# Patient Record
Sex: FEMALE | Race: WHITE | HISPANIC OR LATINO | Employment: FULL TIME | URBAN - METROPOLITAN AREA
[De-identification: names, ages, dates, MRNs, and addresses within clinical notes are randomized per-mention and may not be internally consistent; named-entity substitution may affect disease eponyms.]

---

## 2018-01-11 ENCOUNTER — OFFICE VISIT (OUTPATIENT)
Dept: URGENT CARE | Facility: CLINIC | Age: 23
End: 2018-01-11
Payer: COMMERCIAL

## 2018-01-11 ENCOUNTER — HOSPITAL ENCOUNTER (OUTPATIENT)
Facility: MEDICAL CENTER | Age: 23
End: 2018-01-11
Attending: PHYSICIAN ASSISTANT
Payer: COMMERCIAL

## 2018-01-11 VITALS
DIASTOLIC BLOOD PRESSURE: 70 MMHG | TEMPERATURE: 97 F | OXYGEN SATURATION: 97 % | HEIGHT: 62 IN | SYSTOLIC BLOOD PRESSURE: 122 MMHG | BODY MASS INDEX: 29.44 KG/M2 | HEART RATE: 98 BPM | WEIGHT: 160 LBS | RESPIRATION RATE: 16 BRPM

## 2018-01-11 DIAGNOSIS — N30.00 ACUTE CYSTITIS WITHOUT HEMATURIA: ICD-10-CM

## 2018-01-11 LAB
APPEARANCE UR: NORMAL
BILIRUB UR STRIP-MCNC: NORMAL MG/DL
COLOR UR AUTO: YELLOW
GLUCOSE UR STRIP.AUTO-MCNC: NORMAL MG/DL
INT CON NEG: NEGATIVE
INT CON POS: POSITIVE
KETONES UR STRIP.AUTO-MCNC: NORMAL MG/DL
LEUKOCYTE ESTERASE UR QL STRIP.AUTO: NORMAL
NITRITE UR QL STRIP.AUTO: NORMAL
PH UR STRIP.AUTO: 6 [PH] (ref 5–8)
POC URINE PREGNANCY TEST: NORMAL
PROT UR QL STRIP: NORMAL MG/DL
RBC UR QL AUTO: NORMAL
SP GR UR STRIP.AUTO: 1.01
UROBILINOGEN UR STRIP-MCNC: NORMAL MG/DL

## 2018-01-11 PROCEDURE — 81025 URINE PREGNANCY TEST: CPT | Performed by: PHYSICIAN ASSISTANT

## 2018-01-11 PROCEDURE — 87086 URINE CULTURE/COLONY COUNT: CPT

## 2018-01-11 PROCEDURE — 87077 CULTURE AEROBIC IDENTIFY: CPT

## 2018-01-11 PROCEDURE — 81002 URINALYSIS NONAUTO W/O SCOPE: CPT | Performed by: PHYSICIAN ASSISTANT

## 2018-01-11 PROCEDURE — 99203 OFFICE O/P NEW LOW 30 MIN: CPT | Performed by: PHYSICIAN ASSISTANT

## 2018-01-11 PROCEDURE — 87186 SC STD MICRODIL/AGAR DIL: CPT

## 2018-01-11 RX ORDER — NITROFURANTOIN 25; 75 MG/1; MG/1
100 CAPSULE ORAL EVERY 12 HOURS
Qty: 10 CAP | Refills: 0 | Status: SHIPPED | OUTPATIENT
Start: 2018-01-11 | End: 2018-01-16

## 2018-01-11 NOTE — LETTER
January 11, 2018         Patient: Gabrielle Rodas   YOB: 1995   Date of Visit: 1/11/2018           To Whom it May Concern:    Gabrielle Rodas was seen in my clinic on 1/11/2018. Please excuse her from work 1/10-1/11/2018.    If you have any questions or concerns, please don't hesitate to call.        Sincerely,           Khanh Stanley P.A.-C.  Electronically Signed

## 2018-01-12 DIAGNOSIS — N30.00 ACUTE CYSTITIS WITHOUT HEMATURIA: ICD-10-CM

## 2018-01-12 ASSESSMENT — ENCOUNTER SYMPTOMS
FEVER: 0
COUGH: 0
PALPITATIONS: 0
SHORTNESS OF BREATH: 0
FLANK PAIN: 0
BACK PAIN: 0
CHILLS: 0

## 2018-01-12 NOTE — PROGRESS NOTES
"Subjective:      Gabrielle Rodas is a 22 y.o. female who presents with UTI (x 4 days)            UTI   This is a new problem. The current episode started yesterday. The problem occurs constantly. The problem has been waxing and waning. Associated symptoms include urinary symptoms. Pertinent negatives include no chest pain, chills, coughing or fever. Nothing aggravates the symptoms. She has tried nothing for the symptoms.       Review of Systems   Constitutional: Negative for chills and fever.   Respiratory: Negative for cough and shortness of breath.    Cardiovascular: Negative for chest pain and palpitations.   Genitourinary: Positive for dysuria, frequency and urgency. Negative for flank pain and hematuria.   Musculoskeletal: Negative for back pain.   All other systems reviewed and are negative.    PMH:  has a past medical history of History of recurrent UTIs.  MEDS:   Current Outpatient Prescriptions:   •  nitrofurantoin monohydr macro (MACROBID) 100 MG Cap, Take 1 Cap by mouth every 12 hours for 5 days., Disp: 10 Cap, Rfl: 0  •  CRANBERRY PO, Take  by mouth., Disp: , Rfl:   •  hydrocodone-acetaminophen (NORCO) 5-325 MG Tab per tablet, Take 1-2 Tabs by mouth every 6 hours as needed., Disp: 20 Tab, Rfl: 0  ALLERGIES:   Allergies   Allergen Reactions   • Amoxicillin      SURGHX: History reviewed. No pertinent surgical history.  SOCHX:  reports that she has never smoked. She has never used smokeless tobacco. She reports that she uses drugs, including Marijuana. She reports that she does not drink alcohol.  FH: Family history was reviewed, no pertinent findings to report  Medications, Allergies, and current problem list reviewed today in Epic       Objective:     /70   Pulse 98   Temp 36.1 °C (97 °F)   Resp 16   Ht 1.575 m (5' 2\")   Wt 72.6 kg (160 lb)   LMP 11/28/2017   SpO2 97%   Breastfeeding? No   BMI 29.26 kg/m²      Physical Exam   Constitutional: She is oriented to person, place, and time. She " appears well-developed and well-nourished.   Neck: Normal range of motion. Neck supple.   Cardiovascular: Normal rate, regular rhythm and normal heart sounds.    Pulmonary/Chest: Effort normal and breath sounds normal.   Musculoskeletal: She exhibits no tenderness.   No CVA tenderness   Neurological: She is alert and oriented to person, place, and time.   Skin: Skin is warm and dry.   Psychiatric: She has a normal mood and affect. Her behavior is normal. Judgment and thought content normal.   Vitals reviewed.           PREG: NEG     POC Color YELLOW Negative Final   POC Appearance CLOUDY Negative Final   POC Leukocyte Esterase NEG Negative Final   POC Nitrites NEG Negative Final   POC Urobiligen NEG Negative (0.2) mg/dL Final   POC Protein NEG Negative mg/dL Final   POC Urine PH 6.0 5.0 - 8.0 Final   POC Blood TR Negative Final   POC Specific Gravity 1.015 <1.005 - >1.030 Final   POC Ketones NEG Negative mg/dL Final   POC Biliruben NEG Negative mg/dL Final   POC Glucose NEG Negative mg/dL Final       Assessment/Plan:   UA appears unremarkable.  Pt wishes to trial antibiotic while culture is pending due to her symptoms.    1. Acute cystitis without hematuria    - POCT Urinalysis  - POCT PREGNANCY  - Urine Culture; Future  - nitrofurantoin monohydr macro (MACROBID) 100 MG Cap; Take 1 Cap by mouth every 12 hours for 5 days.  Dispense: 10 Cap; Refill: 0    Differential diagnosis, natural history, supportive care, and indications for immediate follow-up discussed at length.   Follow-up with primary care provider within 4-5 days, emergency room precautions discussed.  Patient and/or family appears understanding of information.

## 2018-01-14 LAB
BACTERIA UR CULT: ABNORMAL
BACTERIA UR CULT: ABNORMAL
SIGNIFICANT IND 70042: ABNORMAL
SITE SITE: ABNORMAL
SOURCE SOURCE: ABNORMAL

## 2018-04-17 ENCOUNTER — HOSPITAL ENCOUNTER (EMERGENCY)
Facility: MEDICAL CENTER | Age: 23
End: 2018-04-18
Attending: EMERGENCY MEDICINE

## 2018-04-17 DIAGNOSIS — S60.211A CONTUSION OF RIGHT WRIST, INITIAL ENCOUNTER: ICD-10-CM

## 2018-04-17 DIAGNOSIS — S60.221A CONTUSION OF RIGHT HAND, INITIAL ENCOUNTER: ICD-10-CM

## 2018-04-17 PROCEDURE — 99284 EMERGENCY DEPT VISIT MOD MDM: CPT

## 2018-04-17 ASSESSMENT — PAIN SCALES - GENERAL: PAINLEVEL_OUTOF10: 10

## 2018-04-18 ENCOUNTER — APPOINTMENT (OUTPATIENT)
Dept: RADIOLOGY | Facility: MEDICAL CENTER | Age: 23
End: 2018-04-18
Attending: EMERGENCY MEDICINE

## 2018-04-18 VITALS
SYSTOLIC BLOOD PRESSURE: 146 MMHG | OXYGEN SATURATION: 97 % | DIASTOLIC BLOOD PRESSURE: 89 MMHG | HEIGHT: 62 IN | TEMPERATURE: 96.8 F | RESPIRATION RATE: 18 BRPM | HEART RATE: 92 BPM | BODY MASS INDEX: 28.93 KG/M2 | WEIGHT: 157.19 LBS

## 2018-04-18 PROCEDURE — 73110 X-RAY EXAM OF WRIST: CPT | Mod: RT

## 2018-04-18 PROCEDURE — 700102 HCHG RX REV CODE 250 W/ 637 OVERRIDE(OP): Performed by: EMERGENCY MEDICINE

## 2018-04-18 PROCEDURE — A9270 NON-COVERED ITEM OR SERVICE: HCPCS | Performed by: EMERGENCY MEDICINE

## 2018-04-18 PROCEDURE — 73130 X-RAY EXAM OF HAND: CPT | Mod: RT

## 2018-04-18 RX ORDER — NAPROXEN 500 MG/1
500 TABLET ORAL ONCE
Status: COMPLETED | OUTPATIENT
Start: 2018-04-18 | End: 2018-04-18

## 2018-04-18 RX ADMIN — Medication 500 MG: at 00:30

## 2018-04-18 ASSESSMENT — PAIN SCALES - GENERAL: PAINLEVEL_OUTOF10: 8

## 2018-04-18 NOTE — ED PROVIDER NOTES
"ED Provider Note    Scribed for Casimiro Jaimes M.D. by Casimiro Jaimes. 4/18/2018  12:04 AM    CHIEF COMPLAINT  Chief Complaint   Patient presents with   • Hand Pain     pt reports earlier tonight she was upset about a death in family and states, \" so i got upset and I punched a solid door about 10 times.\" pt right hand is red and swollen. pt CMS intact. pt amb to move hand and wrist with pain.     • Wrist Pain       HPI  Gabrielle Roads is a pleasant but tearful and anxious 22 y.o. female who presents to the Emergency Room for right hand and wrist pain after punching a door, reportedly 10 times. She reports moderate to severe constant pain in her right wrist and hand, especially over the distal radius and 5th metacarpal joint. There is a bruise and slight skin abrasion over the 5th metacarpal. She is right-hand dominant.    REVIEW OF SYSTEMS  See HPI for further details.    PAST MEDICAL HISTORY   has a past medical history of History of recurrent UTIs.    SOCIAL HISTORY  Social History     Social History Main Topics   • Smoking status: Never Smoker   • Smokeless tobacco: Never Used   • Alcohol use No   • Drug use: Yes     Types: Marijuana   • Sexual activity: Not on file       SURGICAL HISTORY  patient denies any surgical history    CURRENT MEDICATIONS  Home Medications    **Home medications have not yet been reviewed for this encounter**         ALLERGIES  Allergies   Allergen Reactions   • Pcn [Penicillins]    • Amoxicillin        PHYSICAL EXAM  VITAL SIGNS: /89   Pulse 92   Temp 36 °C (96.8 °F)   Resp 18   Ht 1.575 m (5' 2\")   Wt 71.3 kg (157 lb 3 oz)   SpO2 97%   BMI 28.75 kg/m²   Pulse ox interpretation: I interpret this pulse ox as normal.  Constitutional: Alert in no apparent distress.  HENT: Normocephalic, Atraumatic, Bilateral external ears normal. Nose normal.   Eyes: Conjunctiva normal, non-icteric.   Heart: Normal peripheral perfusion  Lungs: Unlabored respirations  Skin: Small " abrasion over right 5th metacarpal joint.  Musculoskeletal: Swelling over the dorsum of the right hand which continues to the proximal wrist. Range of motion is limited by pain. There is no grossly obvious bony abnormality, but the swelling is suspicious for fracture.  Neurologic: Alert, Grossly non-focal.   Psychiatric: Affect normal, Judgment normal, Mood normal, Appears appropriate and not intoxicated.     DX-HAND 3+ RIGHT   Final Result      Negative RIGHT hand series.      DX-WRIST-COMPLETE 3+ RIGHT   Final Result      No radiographic evidence of acute traumatic injury right wrist.          COURSE & MEDICAL DECISION MAKING  The patient's VS, Nurses notes reviewed. (See chart for details)    12:04 AM Patient seen and examined at bedside. Ordered for x-ray of the right wrist and hand to evaluate. Patient will be treated with ice, Naprosyn, and a tetanus shot for her symptoms.     1:40 AM I returned to the bedside to discuss with this patient that she has negative x-rays, and seems to simply have contusions and sprains causing her pain. She was placed in a Velcro wrist splint for comfort and support. We discussed using this as needed for the next few days, I recommended Motrin 600 mg every 6 hours for pain control as well as ice intermittently for the next day or so. I also discussed the normal course of healing, and recommended follow-up with orthopedics if she is not steadily improving over the next 7-10 days. All her questions were answered to the bedside, and she is relieved, and is discharged in stable condition.     The patient will return for new or worsening symptoms and is stable at the time of discharge.    The patient is referred to a primary physician for blood pressure management, diabetic screening, and for all other preventative health concerns.      DISPOSITION:  Patient will be discharged home in stable condition.    FOLLOW UP:  José Zavaleta M.D.  [address]  [address]  [place]  [place] [address]  [phone]    In 1 week  As needed, if pain is not steaduily improving.      OUTPATIENT MEDICATIONS:  Discharge Medication List as of 4/18/2018  1:55 AM            FINAL IMPRESSION  1. Contusion of right hand, initial encounter    2. Contusion of right wrist, initial encounter

## 2018-04-18 NOTE — DISCHARGE INSTRUCTIONS
Your x-rays were normal. There are no fractures or dislocations. He bruised the bones and your hand, and may have sprained joints in the hand and wrist. Use the Velcro wrist splint that we have given you for comfort and support for the next few days. Take 600 mg of Motrin every 6 hours for pain, and use ice packs for the next day or so. If you are not steadily improving, use the contact information we have provided to schedule a follow-up visit with orthopedics.    Hand Injuries  Many types of hand injuries can occur. Some examples are minor broken bones (fractures), sprains, bruises, or burns on the hand.  HOME CARE  · For sprains, keep the hand raised (elevated) above the level of the heart. Do this until the pain and puffiness (swelling) get better.   · Use hand bandages (dressings) and splints to keep the hand still, lessen pain, and prevent more injury. Do not remove the bandage or splint until your doctor says it is okay.   · For broken bones, sprains, and bruises, put ice on the injured area.   · Put ice in a plastic bag.   · Place a towel between the skin and the bag.   · Leave the ice on for 15-20 minutes, 3-4 times a day. Do this for 2 3 days.   · Only take medicine as told by your doctor.   · Follow up with your doctor as told.   · You may need to do hand motions to decrease stiffness. Do not do any activities that increase pain in the hand.   GET HELP RIGHT AWAY IF:   · Your pain, puffiness, or redness in your hand get worse.   · Your pain is not controlled with medicine.   · You have a fever or lasting symptoms for more than 2 3 days.   · You have a fever and your symptoms suddenly get worse.   · You have pain when moving your fingers.   · You have yellowish-white fluid (pus) coming from the wound.   · You lose feeling (have numbness) in your fingers.   · Your pain or puffiness does not get better in 2 3 days.   MAKE SURE YOU:   · Understand these instructions.   · Will watch this condition.   · Will  get help right away if you are not doing well or get worse.   Document Released: 03/14/2011 Document Revised: 09/11/2013 Document Reviewed: 06/30/2013  [employer]® Patient Information ©2013 [employer]

## 2018-04-18 NOTE — ED TRIAGE NOTES
"Chief Complaint   Patient presents with   • Hand Pain     pt reports earlier tonight she was upset about a death in family and states, \" so i got upset and I punched a solid door about 10 times.\" pt right hand is red and swollen. pt CMS intact. pt amb to move hand and wrist with pain.     • Wrist Pain       Pt ambulatory to triage with above complaint, VSS with elevated HR noted, pt educated on triage process, placed back in lobby, pt instructed to notify staff of any issues.     Blood pressure 148/84, pulse (!) 123, temperature 36 °C (96.8 °F), resp. rate 18, height 1.575 m (5' 2\"), weight 71.3 kg (157 lb 3 oz), SpO2 97 %.    "

## 2019-02-07 ENCOUNTER — NON-PROVIDER VISIT (OUTPATIENT)
Dept: OCCUPATIONAL MEDICINE | Facility: CLINIC | Age: 24
End: 2019-02-07

## 2019-02-07 DIAGNOSIS — Z02.1 PRE-EMPLOYMENT DRUG SCREENING: ICD-10-CM

## 2019-02-07 PROCEDURE — 80305 DRUG TEST PRSMV DIR OPT OBS: CPT | Performed by: INTERNAL MEDICINE

## 2019-02-07 PROCEDURE — 8911 PR MRO FEE: Performed by: INTERNAL MEDICINE

## 2019-02-08 LAB
AMP AMPHETAMINE: NORMAL
COC COCAINE: NORMAL
INT CON NEG: NORMAL
INT CON POS: NORMAL
MET METHAMPHETAMINES: NORMAL
OPI OPIATES: NORMAL
PCP PHENCYCLIDINE: NORMAL
POC DRUG COMMENT 753798-OCCUPATIONAL HEALTH: NORMAL
THC: NORMAL

## 2020-01-24 ENCOUNTER — APPOINTMENT (OUTPATIENT)
Dept: RADIOLOGY | Facility: MEDICAL CENTER | Age: 25
End: 2020-01-24
Attending: EMERGENCY MEDICINE
Payer: COMMERCIAL

## 2020-01-24 ENCOUNTER — HOSPITAL ENCOUNTER (EMERGENCY)
Facility: MEDICAL CENTER | Age: 25
End: 2020-01-24
Attending: EMERGENCY MEDICINE
Payer: COMMERCIAL

## 2020-01-24 VITALS
OXYGEN SATURATION: 99 % | TEMPERATURE: 98 F | HEIGHT: 62 IN | RESPIRATION RATE: 16 BRPM | BODY MASS INDEX: 31.4 KG/M2 | SYSTOLIC BLOOD PRESSURE: 119 MMHG | WEIGHT: 170.64 LBS | DIASTOLIC BLOOD PRESSURE: 65 MMHG | HEART RATE: 82 BPM

## 2020-01-24 DIAGNOSIS — S63.501A RIGHT WRIST SPRAIN, INITIAL ENCOUNTER: ICD-10-CM

## 2020-01-24 DIAGNOSIS — S60.221A CONTUSION OF RIGHT HAND, INITIAL ENCOUNTER: ICD-10-CM

## 2020-01-24 DIAGNOSIS — S60.511A ABRASION OF PALM OF HAND, RIGHT, INITIAL ENCOUNTER: ICD-10-CM

## 2020-01-24 LAB
ANION GAP SERPL CALC-SCNC: 10 MMOL/L (ref 0–11.9)
BASOPHILS # BLD AUTO: 0.5 % (ref 0–1.8)
BASOPHILS # BLD: 0.06 K/UL (ref 0–0.12)
BUN SERPL-MCNC: 10 MG/DL (ref 8–22)
CALCIUM SERPL-MCNC: 9.3 MG/DL (ref 8.4–10.2)
CHLORIDE SERPL-SCNC: 104 MMOL/L (ref 96–112)
CO2 SERPL-SCNC: 25 MMOL/L (ref 20–33)
CREAT SERPL-MCNC: 0.67 MG/DL (ref 0.5–1.4)
EOSINOPHIL # BLD AUTO: 0.12 K/UL (ref 0–0.51)
EOSINOPHIL NFR BLD: 1 % (ref 0–6.9)
ERYTHROCYTE [DISTWIDTH] IN BLOOD BY AUTOMATED COUNT: 37.1 FL (ref 35.9–50)
GLUCOSE SERPL-MCNC: 107 MG/DL (ref 65–99)
HCT VFR BLD AUTO: 39.6 % (ref 37–47)
HGB BLD-MCNC: 13.7 G/DL (ref 12–16)
IMM GRANULOCYTES # BLD AUTO: 0.06 K/UL (ref 0–0.11)
IMM GRANULOCYTES NFR BLD AUTO: 0.5 % (ref 0–0.9)
LYMPHOCYTES # BLD AUTO: 3.87 K/UL (ref 1–4.8)
LYMPHOCYTES NFR BLD: 33.1 % (ref 22–41)
MCH RBC QN AUTO: 30.6 PG (ref 27–33)
MCHC RBC AUTO-ENTMCNC: 34.6 G/DL (ref 33.6–35)
MCV RBC AUTO: 88.6 FL (ref 81.4–97.8)
MONOCYTES # BLD AUTO: 0.67 K/UL (ref 0–0.85)
MONOCYTES NFR BLD AUTO: 5.7 % (ref 0–13.4)
NEUTROPHILS # BLD AUTO: 6.91 K/UL (ref 2–7.15)
NEUTROPHILS NFR BLD: 59.2 % (ref 44–72)
NRBC # BLD AUTO: 0 K/UL
NRBC BLD-RTO: 0 /100 WBC
PLATELET # BLD AUTO: 438 K/UL (ref 164–446)
PMV BLD AUTO: 8.8 FL (ref 9–12.9)
POTASSIUM SERPL-SCNC: 3.9 MMOL/L (ref 3.6–5.5)
RBC # BLD AUTO: 4.47 M/UL (ref 4.2–5.4)
SODIUM SERPL-SCNC: 139 MMOL/L (ref 135–145)
WBC # BLD AUTO: 11.7 K/UL (ref 4.8–10.8)

## 2020-01-24 PROCEDURE — 99284 EMERGENCY DEPT VISIT MOD MDM: CPT

## 2020-01-24 PROCEDURE — 73130 X-RAY EXAM OF HAND: CPT | Mod: RT

## 2020-01-24 PROCEDURE — 700102 HCHG RX REV CODE 250 W/ 637 OVERRIDE(OP): Performed by: EMERGENCY MEDICINE

## 2020-01-24 PROCEDURE — 85025 COMPLETE CBC W/AUTO DIFF WBC: CPT

## 2020-01-24 PROCEDURE — 80048 BASIC METABOLIC PNL TOTAL CA: CPT

## 2020-01-24 PROCEDURE — A9270 NON-COVERED ITEM OR SERVICE: HCPCS | Performed by: EMERGENCY MEDICINE

## 2020-01-24 PROCEDURE — 73110 X-RAY EXAM OF WRIST: CPT | Mod: RT

## 2020-01-24 RX ORDER — HYDROCODONE BITARTRATE AND ACETAMINOPHEN 5; 325 MG/1; MG/1
1 TABLET ORAL ONCE
Status: COMPLETED | OUTPATIENT
Start: 2020-01-24 | End: 2020-01-24

## 2020-01-24 RX ADMIN — HYDROCODONE BITARTRATE AND ACETAMINOPHEN 1 TABLET: 5; 325 TABLET ORAL at 17:18

## 2020-01-24 ASSESSMENT — LIFESTYLE VARIABLES
EVER HAD A DRINK FIRST THING IN THE MORNING TO STEADY YOUR NERVES TO GET RID OF A HANGOVER: NO
TOTAL SCORE: 0
HAVE YOU EVER FELT YOU SHOULD CUT DOWN ON YOUR DRINKING: NO
EVER FELT BAD OR GUILTY ABOUT YOUR DRINKING: NO
HAVE PEOPLE ANNOYED YOU BY CRITICIZING YOUR DRINKING: NO
TOTAL SCORE: 0
DO YOU DRINK ALCOHOL: NO
CONSUMPTION TOTAL: INCOMPLETE
TOTAL SCORE: 0

## 2020-01-24 NOTE — ED PROVIDER NOTES
"ED Provider Note    CHIEF COMPLAINT  Chief Complaint   Patient presents with   • Wound Infection     fell and abrased the R palm  now infected        HPI  Gabrielle Rodas is a 24 y.o. female who presents complaining of right hand pain.    Patient states that she fell/slid 2 days ago onto her bilateral palms.  She cleaned the wounds and applied Nu-Skin to the right palm wound.  She now has increased pain that is moderate to severe, throbbing, with some radiation into her thumb and associated with swelling, and yellowish drainage.  Patient was placed on Keflex that she has been taking since yesterday.    Patient states she has been cleaning the wound at least daily with Hibiclens and spraying benzalkonium onto it.  She is concerned that there is residual Nu-Skin over half of the wound and would like it removed.  She was able to peel the other half off.  She also wonders, as she received differing opinions, if she should leave the wound open or covered.    Patient denies weakness and numbness.      ALLERGIES  Allergies   Allergen Reactions   • Pcn [Penicillins]    • Amoxicillin        CURRENT MEDICATIONS  Keflex     PAST MEDICAL HISTORY   has a past medical history of History of recurrent UTIs.    SURGICAL HISTORY  patient denies any surgical history    SOCIAL HISTORY  Social History     Tobacco Use   • Smoking status: Never Smoker   • Smokeless tobacco: Never Used   Substance and Sexual Activity   • Alcohol use: No   • Drug use: Yes     Types: Marijuana   • Sexual activity: Not on file       REVIEW OF SYSTEMS  Patient admits to right palm pain, drainage  pt denies weakness, numbness, fever, chills  See HPI for further details.       PHYSICAL EXAM  VITAL SIGNS: /65   Pulse 82   Temp 36.7 °C (98 °F) (Temporal)   Resp 16   Ht 1.575 m (5' 2\")   Wt 77.4 kg (170 lb 10.2 oz)   SpO2 99%   BMI 31.21 kg/m²     General:  WDWN, nontoxic appearing in NAD; A+Ox3; V/S as above; afebrile  Skin: warm and dry; good color; " no rash  HEENT: NCAT; EOMs intact; PERRL; no scleral icterus   Neck: FROM, soft  Extremities: MIRELES x 4; bilateral palm abrasions; right greater inside than left; clear covering consistent with skin adhesive present on the ulnar half of the right palm abrasion; faint surrounding erythema over the radial portion of the abrasions; no streaking, no crepitus, no fluctuance; minimal edema of the thenar eminence; no snuffbox tenderness; no wrist tenderness  Neurologic: CNs III-XII grossly intact; speech clear; distal sensation intact; strength 5/5 UE/LEs  Psychiatric: Appropriate affect, normal mood    LABS  Results for orders placed or performed during the hospital encounter of 01/24/20   CBC WITH DIFFERENTIAL   Result Value Ref Range    WBC 11.7 (H) 4.8 - 10.8 K/uL    RBC 4.47 4.20 - 5.40 M/uL    Hemoglobin 13.7 12.0 - 16.0 g/dL    Hematocrit 39.6 37.0 - 47.0 %    MCV 88.6 81.4 - 97.8 fL    MCH 30.6 27.0 - 33.0 pg    MCHC 34.6 33.6 - 35.0 g/dL    RDW 37.1 35.9 - 50.0 fL    Platelet Count 438 164 - 446 K/uL    MPV 8.8 (L) 9.0 - 12.9 fL    Neutrophils-Polys 59.20 44.00 - 72.00 %    Lymphocytes 33.10 22.00 - 41.00 %    Monocytes 5.70 0.00 - 13.40 %    Eosinophils 1.00 0.00 - 6.90 %    Basophils 0.50 0.00 - 1.80 %    Immature Granulocytes 0.50 0.00 - 0.90 %    Nucleated RBC 0.00 /100 WBC    Neutrophils (Absolute) 6.91 2.00 - 7.15 K/uL    Lymphs (Absolute) 3.87 1.00 - 4.80 K/uL    Monos (Absolute) 0.67 0.00 - 0.85 K/uL    Eos (Absolute) 0.12 0.00 - 0.51 K/uL    Baso (Absolute) 0.06 0.00 - 0.12 K/uL    Immature Granulocytes (abs) 0.06 0.00 - 0.11 K/uL    NRBC (Absolute) 0.00 K/uL   Basic Metabolic Panel   Result Value Ref Range    Sodium 139 135 - 145 mmol/L    Potassium 3.9 3.6 - 5.5 mmol/L    Chloride 104 96 - 112 mmol/L    Co2 25 20 - 33 mmol/L    Glucose 107 (H) 65 - 99 mg/dL    Bun 10 8 - 22 mg/dL    Creatinine 0.67 0.50 - 1.40 mg/dL    Calcium 9.3 8.4 - 10.2 mg/dL    Anion Gap 10.0 0.0 - 11.9   ESTIMATED GFR   Result  Value Ref Range    GFR If African American >60 >60 mL/min/1.73 m 2    GFR If Non African American >60 >60 mL/min/1.73 m 2           IMAGING  DX-WRIST-COMPLETE 3+ RIGHT   Final Result      No acute osseous abnormality.      DX-HAND 3+ RIGHT   Final Result      No acute osseous abnormality.        MEDICAL RECORD  I have reviewed the patient's medical record and pertinent results as listed.      COURSE & MEDICAL DECISION MAKING  I have reviewed any medical record information, laboratory studies and radiographic results as noted.    Gabrielle Rodas is a 24 y.o. female who presents complaining of right hand abrasion and concern for infection.  There is no obvious cellulitis although there is some mild erythema and the patient's scab is half yellow in color.  This portion is covered by a topical skin adhesive that the patient applied.    X-rays are negative for acute pathology.  Patient's white blood cell count is slightly elevated--possibly related to dehydration, inflammation, and less likely infection/cellulitis.  No concern for abscess.    Pt re-evaluated.  Patient's scab is now spontaneously uniform in color.  The erythema has not progressed since she has been here.  There is no bony tenderness.  She feels improved after Norco.  I suspect the patient has a wrist sprain secondary to a sprain.  She reports the pain is shooting at times in certain positions.  I do not suspect a scaphoid fracture.  Patient is neurovascularly intact.  We will place her in a splint to treat what is likely a sprain.  I advised her to come for a recheck of the wound in 24 hours, to continue with Keflex, to wash with mild soap and water rather than using Hibiclens and benzalkonium to clean the wound.  She understands the plan and will return for worsening symptoms.      FINAL IMPRESSION  1. Abrasion of palm of hand, right, initial encounter     2. Right wrist sprain, initial encounter     3. Contusion of right hand, initial encounter               Electronically signed by: Esther Weldon M.D., 1/24/2020 3:54 PM

## 2020-01-24 NOTE — ED TRIAGE NOTES
Pt comes in c/o R hand pain and infection s/p falling and scraping the skin off about 2 days ago  Was seen at [facility] and started on antibiotic however does not feel it getting better  increased pain and now has yellow covering on it has been using OTC dressings and believes this is causing the issues

## 2020-01-25 NOTE — DISCHARGE INSTRUCTIONS
Wear splint, take ibuprofen and/or Tylenol, and ice the wrist for possible sprain and bruise to the palm    Wash the wound daily gently with mild soap and water, pat dry, and allow to air dry overnight.  Cover the wound with a loose bandage during the day to prevent infection.    Continue taking Keflex as prescribed    Return to the ER for fever, increased redness, drainage, or other concerns

## 2020-01-25 NOTE — ED NOTES
Pt discharged, reviewed all discharge instructions including medications and follow up, pt verbalizes understanding, and denies questions. Pt given work note.  Escorted to lobby.